# Patient Record
Sex: FEMALE | Race: WHITE | HISPANIC OR LATINO | URBAN - METROPOLITAN AREA
[De-identification: names, ages, dates, MRNs, and addresses within clinical notes are randomized per-mention and may not be internally consistent; named-entity substitution may affect disease eponyms.]

---

## 2017-01-12 ENCOUNTER — EMERGENCY (EMERGENCY)
Facility: HOSPITAL | Age: 52
LOS: 1 days | Discharge: LEFT BEFORE DISCHARGE | End: 2017-01-12
Attending: EMERGENCY MEDICINE | Admitting: EMERGENCY MEDICINE
Payer: COMMERCIAL

## 2017-01-12 VITALS
OXYGEN SATURATION: 99 % | DIASTOLIC BLOOD PRESSURE: 85 MMHG | RESPIRATION RATE: 20 BRPM | SYSTOLIC BLOOD PRESSURE: 129 MMHG | HEART RATE: 102 BPM | WEIGHT: 184.97 LBS | TEMPERATURE: 98 F | HEIGHT: 65 IN

## 2017-01-12 VITALS
HEART RATE: 88 BPM | TEMPERATURE: 99 F | DIASTOLIC BLOOD PRESSURE: 87 MMHG | SYSTOLIC BLOOD PRESSURE: 121 MMHG | OXYGEN SATURATION: 96 % | RESPIRATION RATE: 20 BRPM

## 2017-01-12 DIAGNOSIS — I72.9 ANEURYSM OF UNSPECIFIED SITE: Chronic | ICD-10-CM

## 2017-01-12 DIAGNOSIS — Z90.710 ACQUIRED ABSENCE OF BOTH CERVIX AND UTERUS: Chronic | ICD-10-CM

## 2017-01-12 LAB
ALBUMIN SERPL ELPH-MCNC: 4.1 G/DL — SIGNIFICANT CHANGE UP (ref 3.3–5.2)
ALP SERPL-CCNC: 57 U/L — SIGNIFICANT CHANGE UP (ref 40–120)
ALT FLD-CCNC: 22 U/L — SIGNIFICANT CHANGE UP
ANION GAP SERPL CALC-SCNC: 14 MMOL/L — SIGNIFICANT CHANGE UP (ref 5–17)
AST SERPL-CCNC: 33 U/L — HIGH
BASOPHILS # BLD AUTO: 0 K/UL — SIGNIFICANT CHANGE UP (ref 0–0.2)
BASOPHILS NFR BLD AUTO: 0.5 % — SIGNIFICANT CHANGE UP (ref 0–2)
BILIRUB SERPL-MCNC: 0.3 MG/DL — LOW (ref 0.4–2)
BUN SERPL-MCNC: 9 MG/DL — SIGNIFICANT CHANGE UP (ref 8–20)
CALCIUM SERPL-MCNC: 8.9 MG/DL — SIGNIFICANT CHANGE UP (ref 8.6–10.2)
CHLORIDE SERPL-SCNC: 97 MMOL/L — LOW (ref 98–107)
CK SERPL-CCNC: 144 U/L — SIGNIFICANT CHANGE UP (ref 25–170)
CO2 SERPL-SCNC: 23 MMOL/L — SIGNIFICANT CHANGE UP (ref 22–29)
CREAT SERPL-MCNC: 0.55 MG/DL — SIGNIFICANT CHANGE UP (ref 0.5–1.3)
D DIMER BLD IA.RAPID-MCNC: 1431 NG/ML DDU — HIGH
EOSINOPHIL # BLD AUTO: 0.1 K/UL — SIGNIFICANT CHANGE UP (ref 0–0.5)
EOSINOPHIL NFR BLD AUTO: 3.4 % — SIGNIFICANT CHANGE UP (ref 0–6)
GLUCOSE SERPL-MCNC: 90 MG/DL — SIGNIFICANT CHANGE UP (ref 70–115)
HCT VFR BLD CALC: 38.5 % — SIGNIFICANT CHANGE UP (ref 37–47)
HGB BLD-MCNC: 13.2 G/DL — SIGNIFICANT CHANGE UP (ref 12–16)
LYMPHOCYTES # BLD AUTO: 1.3 K/UL — SIGNIFICANT CHANGE UP (ref 1–4.8)
LYMPHOCYTES # BLD AUTO: 32.5 % — SIGNIFICANT CHANGE UP (ref 20–55)
MCHC RBC-ENTMCNC: 30 PG — SIGNIFICANT CHANGE UP (ref 27–31)
MCHC RBC-ENTMCNC: 34.3 G/DL — SIGNIFICANT CHANGE UP (ref 32–36)
MCV RBC AUTO: 87.5 FL — SIGNIFICANT CHANGE UP (ref 81–99)
MONOCYTES # BLD AUTO: 0.4 K/UL — SIGNIFICANT CHANGE UP (ref 0–0.8)
MONOCYTES NFR BLD AUTO: 10.6 % — HIGH (ref 3–10)
NEUTROPHILS # BLD AUTO: 2 K/UL — SIGNIFICANT CHANGE UP (ref 1.8–8)
NEUTROPHILS NFR BLD AUTO: 52.7 % — SIGNIFICANT CHANGE UP (ref 37–73)
NT-PROBNP SERPL-SCNC: 64 PG/ML — SIGNIFICANT CHANGE UP (ref 0–300)
PLATELET # BLD AUTO: 151 K/UL — SIGNIFICANT CHANGE UP (ref 150–400)
POTASSIUM SERPL-MCNC: 4.6 MMOL/L — SIGNIFICANT CHANGE UP (ref 3.5–5.3)
POTASSIUM SERPL-SCNC: 4.6 MMOL/L — SIGNIFICANT CHANGE UP (ref 3.5–5.3)
PROT SERPL-MCNC: 8.5 G/DL — SIGNIFICANT CHANGE UP (ref 6.6–8.7)
RBC # BLD: 4.4 M/UL — SIGNIFICANT CHANGE UP (ref 4.4–5.2)
RBC # FLD: 13.4 % — SIGNIFICANT CHANGE UP (ref 11–15.6)
SODIUM SERPL-SCNC: 134 MMOL/L — LOW (ref 135–145)
TROPONIN T SERPL-MCNC: <0.01 NG/ML — SIGNIFICANT CHANGE UP (ref 0–0.06)
WBC # BLD: 3.88 K/UL — LOW (ref 4.8–10.8)
WBC # FLD AUTO: 3.88 K/UL — LOW (ref 4.8–10.8)

## 2017-01-12 PROCEDURE — 99285 EMERGENCY DEPT VISIT HI MDM: CPT

## 2017-01-12 PROCEDURE — 80053 COMPREHEN METABOLIC PANEL: CPT

## 2017-01-12 PROCEDURE — 71045 X-RAY EXAM CHEST 1 VIEW: CPT

## 2017-01-12 PROCEDURE — 83880 ASSAY OF NATRIURETIC PEPTIDE: CPT

## 2017-01-12 PROCEDURE — 84484 ASSAY OF TROPONIN QUANT: CPT

## 2017-01-12 PROCEDURE — 85379 FIBRIN DEGRADATION QUANT: CPT

## 2017-01-12 PROCEDURE — 82550 ASSAY OF CK (CPK): CPT

## 2017-01-12 PROCEDURE — 71010: CPT | Mod: 26

## 2017-01-12 PROCEDURE — 93010 ELECTROCARDIOGRAM REPORT: CPT

## 2017-01-12 PROCEDURE — 85027 COMPLETE CBC AUTOMATED: CPT

## 2017-01-12 PROCEDURE — 93005 ELECTROCARDIOGRAM TRACING: CPT

## 2017-01-12 PROCEDURE — 99284 EMERGENCY DEPT VISIT MOD MDM: CPT | Mod: 25

## 2017-01-12 NOTE — ED ADULT NURSE NOTE - EXPLANATION OF PATIENT'S REASON FOR LEAVING
Stated "Too Fuckin Busy in here", Pt was advised of benefits of staying here for treatment and risks of leaving, pt left after IV removed

## 2017-01-12 NOTE — ED ADULT TRIAGE NOTE - CHIEF COMPLAINT QUOTE
Pt comes from front lobby as rapid response. Pt c/o sudden onset chest pressure and sob approx 20 minutes ago.

## 2017-01-12 NOTE — ED PROVIDER NOTE - MEDICAL DECISION MAKING DETAILS
51yoF with intermittent chest pain/ SOB/ nausea, now with syncope - no recent cardiac eval. would recommend admission  for cardiac eval.

## 2017-01-12 NOTE — ED ADULT NURSE REASSESSMENT NOTE - NS ED NURSE REASSESS COMMENT FT1
Pt remains in ambulance triage, just now reports that she originally came to the hospital for a fall and syncopal episode. Pt c/o pain to right chest - deformity to right clavicle.

## 2017-01-12 NOTE — ED ADULT NURSE NOTE - OBJECTIVE STATEMENT
Pt A&OX3, c/o chest pain since this AM.  Pt is crying and states she fell on cement when she walked out of her home to come to ED, pt states she passed out.   Pt c/o severe pain to right clavicle, bulge noted to right clavicle.  Pt state she is having "relationship issues" and has been very stressed out.  Pt denies any sob.  Clear bsb, abd soft nondistended, nontender, moving all ext well.  Pt also has an abrasion to right leg.  NSR on monitor.  CM maintained.

## 2017-01-12 NOTE — ED PROVIDER NOTE - OBJECTIVE STATEMENT
51F with h/o anxiety, intermittent chest pain and syncope in the past ( no known cardiac etiology) pw several days of intermittent chest pain associated with nausea, SOB, and syncopal episode today in which she fell on the concrete outside her house. Denies prodrome.  Mostly c/o R clavicle pain now ( h/o fracture there).  Was seen in the past at [facility] and lives in [place] but wanted to come here. Denies recent travel/ calf swelling. No fevers/chills. Does not have a cardiologist. Denies any stress testing within the last year.  PMD: / Mery Lynch.

## 2017-01-13 ENCOUNTER — EMERGENCY (EMERGENCY)
Facility: HOSPITAL | Age: 52
LOS: 1 days | Discharge: LEFT BEFORE DISCHARGE | End: 2017-01-13
Payer: COMMERCIAL

## 2017-01-13 VITALS — HEIGHT: 65 IN | WEIGHT: 184.97 LBS

## 2017-01-13 VITALS
SYSTOLIC BLOOD PRESSURE: 144 MMHG | RESPIRATION RATE: 18 BRPM | OXYGEN SATURATION: 99 % | HEART RATE: 73 BPM | DIASTOLIC BLOOD PRESSURE: 94 MMHG | TEMPERATURE: 98 F

## 2017-01-13 DIAGNOSIS — Z04.9 ENCOUNTER FOR EXAMINATION AND OBSERVATION FOR UNSPECIFIED REASON: ICD-10-CM

## 2017-01-13 DIAGNOSIS — I72.9 ANEURYSM OF UNSPECIFIED SITE: Chronic | ICD-10-CM

## 2017-01-13 DIAGNOSIS — Z90.710 ACQUIRED ABSENCE OF BOTH CERVIX AND UTERUS: Chronic | ICD-10-CM

## 2017-01-13 PROCEDURE — T1013: CPT

## 2017-01-13 NOTE — ED POST DISCHARGE NOTE - REASON FOR FOLLOW-UP
Other + d dimer w/ chest pain- pt walked out - left messages in english and Indonesian and sent letter + d dimer w/ chest pain & syncope- pt walked out - left messages in english and Cuban and sent letter

## 2017-04-14 RX ORDER — CHLORHEXIDINE GLUCONATE 213 G/1000ML
1 SOLUTION TOPICAL ONCE
Qty: 0 | Refills: 0 | Status: DISCONTINUED | OUTPATIENT
Start: 2017-04-17 | End: 2017-04-17

## 2017-04-17 ENCOUNTER — OUTPATIENT (OUTPATIENT)
Dept: OUTPATIENT SERVICES | Facility: HOSPITAL | Age: 52
LOS: 1 days | Discharge: NURSING, REHAB OR LONG-TERM CARE | End: 2017-04-17
Payer: COMMERCIAL

## 2017-04-17 DIAGNOSIS — M19.90 UNSPECIFIED OSTEOARTHRITIS, UNSPECIFIED SITE: ICD-10-CM

## 2017-04-17 DIAGNOSIS — E66.9 OBESITY, UNSPECIFIED: ICD-10-CM

## 2017-04-17 DIAGNOSIS — Z90.710 ACQUIRED ABSENCE OF BOTH CERVIX AND UTERUS: Chronic | ICD-10-CM

## 2017-04-17 DIAGNOSIS — F41.9 ANXIETY DISORDER, UNSPECIFIED: ICD-10-CM

## 2017-04-17 DIAGNOSIS — I60.2 NONTRAUMATIC SUBARACHNOID HEMORRHAGE FROM ANTERIOR COMMUNICATING ARTERY: ICD-10-CM

## 2017-04-17 DIAGNOSIS — Z09 ENCOUNTER FOR FOLLOW-UP EXAMINATION AFTER COMPLETED TREATMENT FOR CONDITIONS OTHER THAN MALIGNANT NEOPLASM: ICD-10-CM

## 2017-04-17 DIAGNOSIS — Z86.79 PERSONAL HISTORY OF OTHER DISEASES OF THE CIRCULATORY SYSTEM: ICD-10-CM

## 2017-04-17 DIAGNOSIS — I72.9 ANEURYSM OF UNSPECIFIED SITE: Chronic | ICD-10-CM

## 2017-04-17 PROCEDURE — 93005 ELECTROCARDIOGRAM TRACING: CPT

## 2017-04-17 PROCEDURE — 36224 PLACE CATH CAROTD ART: CPT | Mod: RT

## 2017-04-17 PROCEDURE — C1894: CPT

## 2017-04-17 PROCEDURE — 93010 ELECTROCARDIOGRAM REPORT: CPT

## 2017-04-17 PROCEDURE — C1769: CPT

## 2017-04-17 PROCEDURE — C1887: CPT

## 2017-04-17 NOTE — BRIEF OPERATIVE NOTE - OPERATION/FINDINGS
PROCEDURE  Diagnostic femoral cerebral angiogram    2% lidocaine local anesthesia  4Fr short arterial- sheath, R CFA  R ICA injection  Stable complete occlusion of AComA aneurysm S/P coil embolization  Manual groin compression  No complications, no groin hematoma

## 2017-05-01 ENCOUNTER — EMERGENCY (EMERGENCY)
Facility: HOSPITAL | Age: 52
LOS: 1 days | Discharge: HOME | End: 2017-05-01
Attending: EMERGENCY MEDICINE | Admitting: EMERGENCY MEDICINE
Payer: COMMERCIAL

## 2017-05-01 ENCOUNTER — OUTPATIENT (OUTPATIENT)
Dept: OUTPATIENT SERVICES | Facility: HOSPITAL | Age: 52
LOS: 1 days | End: 2017-05-01
Payer: COMMERCIAL

## 2017-05-01 VITALS
DIASTOLIC BLOOD PRESSURE: 82 MMHG | TEMPERATURE: 98 F | OXYGEN SATURATION: 99 % | SYSTOLIC BLOOD PRESSURE: 114 MMHG | RESPIRATION RATE: 18 BRPM | HEART RATE: 69 BPM

## 2017-05-01 VITALS
DIASTOLIC BLOOD PRESSURE: 83 MMHG | RESPIRATION RATE: 18 BRPM | TEMPERATURE: 98 F | SYSTOLIC BLOOD PRESSURE: 124 MMHG | OXYGEN SATURATION: 99 % | HEART RATE: 68 BPM

## 2017-05-01 DIAGNOSIS — G43.909 MIGRAINE, UNSPECIFIED, NOT INTRACTABLE, WITHOUT STATUS MIGRAINOSUS: ICD-10-CM

## 2017-05-01 DIAGNOSIS — I72.9 ANEURYSM OF UNSPECIFIED SITE: Chronic | ICD-10-CM

## 2017-05-01 DIAGNOSIS — R42 DIZZINESS AND GIDDINESS: ICD-10-CM

## 2017-05-01 DIAGNOSIS — Z90.710 ACQUIRED ABSENCE OF BOTH CERVIX AND UTERUS: Chronic | ICD-10-CM

## 2017-05-01 DIAGNOSIS — Z88.8 ALLERGY STATUS TO OTHER DRUGS, MEDICAMENTS AND BIOLOGICAL SUBSTANCES STATUS: ICD-10-CM

## 2017-05-01 DIAGNOSIS — Z79.899 OTHER LONG TERM (CURRENT) DRUG THERAPY: ICD-10-CM

## 2017-05-01 PROCEDURE — 85027 COMPLETE CBC AUTOMATED: CPT

## 2017-05-01 PROCEDURE — 70544 MR ANGIOGRAPHY HEAD W/O DYE: CPT | Mod: 26

## 2017-05-01 PROCEDURE — 36415 COLL VENOUS BLD VENIPUNCTURE: CPT

## 2017-05-01 PROCEDURE — 93005 ELECTROCARDIOGRAM TRACING: CPT

## 2017-05-01 PROCEDURE — 99284 EMERGENCY DEPT VISIT MOD MDM: CPT | Mod: 25

## 2017-05-01 PROCEDURE — 80048 BASIC METABOLIC PNL TOTAL CA: CPT

## 2017-05-01 PROCEDURE — 99285 EMERGENCY DEPT VISIT HI MDM: CPT

## 2017-05-01 PROCEDURE — 70544 MR ANGIOGRAPHY HEAD W/O DYE: CPT

## 2017-05-01 PROCEDURE — 84484 ASSAY OF TROPONIN QUANT: CPT

## 2017-05-01 PROCEDURE — 96374 THER/PROPH/DIAG INJ IV PUSH: CPT

## 2017-05-01 PROCEDURE — 96375 TX/PRO/DX INJ NEW DRUG ADDON: CPT

## 2017-05-01 RX ORDER — ZOLPIDEM TARTRATE 10 MG/1
1 TABLET ORAL
Qty: 0 | Refills: 0 | COMMUNITY

## 2017-05-01 RX ORDER — MAGNESIUM SULFATE 500 MG/ML
2 VIAL (ML) INJECTION ONCE
Qty: 0 | Refills: 0 | Status: COMPLETED | OUTPATIENT
Start: 2017-05-01 | End: 2017-05-01

## 2017-05-01 RX ORDER — ALPRAZOLAM 0.25 MG
0 TABLET ORAL
Qty: 0 | Refills: 0 | COMMUNITY

## 2017-05-01 RX ORDER — DIPHENHYDRAMINE HCL 50 MG
25 CAPSULE ORAL ONCE
Qty: 0 | Refills: 0 | Status: COMPLETED | OUTPATIENT
Start: 2017-05-01 | End: 2017-05-01

## 2017-05-01 RX ORDER — MORPHINE SULFATE 50 MG/1
2 CAPSULE, EXTENDED RELEASE ORAL ONCE
Qty: 0 | Refills: 0 | Status: DISCONTINUED | OUTPATIENT
Start: 2017-05-01 | End: 2017-05-01

## 2017-05-01 RX ORDER — METOCLOPRAMIDE HCL 10 MG
10 TABLET ORAL ONCE
Qty: 0 | Refills: 0 | Status: COMPLETED | OUTPATIENT
Start: 2017-05-01 | End: 2017-05-01

## 2017-05-01 RX ORDER — SODIUM CHLORIDE 9 MG/ML
1000 INJECTION INTRAMUSCULAR; INTRAVENOUS; SUBCUTANEOUS
Qty: 0 | Refills: 0 | Status: DISCONTINUED | OUTPATIENT
Start: 2017-05-01 | End: 2017-05-05

## 2017-05-01 RX ADMIN — MORPHINE SULFATE 2 MILLIGRAM(S): 50 CAPSULE, EXTENDED RELEASE ORAL at 12:51

## 2017-05-01 RX ADMIN — MORPHINE SULFATE 2 MILLIGRAM(S): 50 CAPSULE, EXTENDED RELEASE ORAL at 13:25

## 2017-05-01 RX ADMIN — Medication 10 MILLIGRAM(S): at 12:55

## 2017-05-01 RX ADMIN — SODIUM CHLORIDE 125 MILLILITER(S): 9 INJECTION INTRAMUSCULAR; INTRAVENOUS; SUBCUTANEOUS at 12:56

## 2017-05-01 RX ADMIN — Medication 25 MILLIGRAM(S): at 12:58

## 2017-05-01 RX ADMIN — Medication 50 GRAM(S): at 12:57
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
## 2017-05-01 NOTE — ED PROVIDER NOTE - PHYSICAL EXAMINATION
CONSTITUTIONAL: Well-appearing; well-nourished; in mild distress due to pain and photophobia  HEAD: Normocephalic; atraumatic.   EYES: PERRL; EOM intact; conjunctiva and sclera clear  ENT: normal nose; no rhinorrhea; normal pharynx with no erythema or lesions.   NECK: Supple; non-tender;   CARDIOVASCULAR: Normal S1, S2; no murmurs, rubs, or gallops. Regular rate and rhythm.   RESPIRATORY: Breathing easily; breath sounds clear and equal bilaterally; no wheezes, rhonchi, or rales.  GI: Soft; non-distended; non-tender; no palpable organomegaly.   EXT: No cyanosis or edema; N/V intact, +painful range of motion in her right shoulder, DNVI  SKIN: Normal for age and race; warm; dry; good turgor; no apparent lesions or rash.   NEURO: A & O x 3; face symmetric; grossly unremarkable.moves all four, sensory intact , strength 5/5, intact cerebellar findings  PSYCHOLOGICAL: The patient’s mood and manner are appropriate.

## 2017-05-01 NOTE — ED PROVIDER NOTE - MEDICAL DECISION MAKING DETAILS
patient states her sx have resolved,and is insistent on going home as her ride is outside.  right arm pain improved, she is able to range it without difficulty and states her right arm typically hurts as she broke her clavicle in the past.  states if her sx worsen she will go to see her doctor today hen she gets to [place]
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
## 2017-08-09 ENCOUNTER — INPATIENT (INPATIENT)
Facility: HOSPITAL | Age: 52
LOS: 1 days | Discharge: HOME | End: 2017-08-11
Payer: MEDICAID

## 2017-08-09 DIAGNOSIS — I72.9 ANEURYSM OF UNSPECIFIED SITE: Chronic | ICD-10-CM

## 2017-08-09 DIAGNOSIS — Z90.710 ACQUIRED ABSENCE OF BOTH CERVIX AND UTERUS: Chronic | ICD-10-CM

## 2017-08-09 PROCEDURE — 71010: CPT | Mod: 26

## 2017-08-09 PROCEDURE — 73562 X-RAY EXAM OF KNEE 3: CPT | Mod: 26,LT

## 2017-08-09 PROCEDURE — 70450 CT HEAD/BRAIN W/O DYE: CPT | Mod: 26

## 2017-08-09 PROCEDURE — 72170 X-RAY EXAM OF PELVIS: CPT | Mod: 26

## 2017-08-09 PROCEDURE — 72125 CT NECK SPINE W/O DYE: CPT | Mod: 26

## 2017-08-09 PROCEDURE — 73030 X-RAY EXAM OF SHOULDER: CPT | Mod: 26,LT

## 2017-08-09 PROCEDURE — 99285 EMERGENCY DEPT VISIT HI MDM: CPT

## 2017-08-10 PROCEDURE — 93880 EXTRACRANIAL BILAT STUDY: CPT | Mod: 26

## 2017-08-10 PROCEDURE — 99223 1ST HOSP IP/OBS HIGH 75: CPT

## 2017-08-11 PROCEDURE — 93306 TTE W/DOPPLER COMPLETE: CPT | Mod: 26

## 2017-08-11 PROCEDURE — 99232 SBSQ HOSP IP/OBS MODERATE 35: CPT

## 2017-11-07 RX ORDER — ALPRAZOLAM 0.25 MG
0 TABLET ORAL
Qty: 0 | Refills: 0 | COMMUNITY

## 2017-11-07 RX ORDER — ZOLPIDEM TARTRATE 10 MG/1
0 TABLET ORAL
Qty: 0 | Refills: 0 | COMMUNITY

## 2019-05-07 PROBLEM — F41.9 ANXIETY DISORDER, UNSPECIFIED: Chronic | Status: ACTIVE | Noted: 2017-01-12

## 2019-05-07 PROBLEM — G47.00 INSOMNIA, UNSPECIFIED: Chronic | Status: ACTIVE | Noted: 2017-01-12

## 2019-05-07 PROBLEM — I67.1 CEREBRAL ANEURYSM, NONRUPTURED: Chronic | Status: ACTIVE | Noted: 2017-05-01

## 2019-05-07 PROBLEM — I72.9 ANEURYSM OF UNSPECIFIED SITE: Chronic | Status: ACTIVE | Noted: 2017-01-12

## 2019-07-09 ENCOUNTER — APPOINTMENT (OUTPATIENT)
Dept: MRI IMAGING | Facility: HOSPITAL | Age: 54
End: 2019-07-09

## 2019-09-17 ENCOUNTER — APPOINTMENT (OUTPATIENT)
Dept: NEUROSURGERY | Facility: CLINIC | Age: 54
End: 2019-09-17
Payer: MEDICAID

## 2019-09-17 VITALS
SYSTOLIC BLOOD PRESSURE: 134 MMHG | RESPIRATION RATE: 16 BRPM | HEIGHT: 62 IN | DIASTOLIC BLOOD PRESSURE: 85 MMHG | WEIGHT: 200 LBS | BODY MASS INDEX: 36.8 KG/M2 | HEART RATE: 75 BPM | OXYGEN SATURATION: 97 %

## 2019-09-17 DIAGNOSIS — Z56.0 UNEMPLOYMENT, UNSPECIFIED: ICD-10-CM

## 2019-09-17 PROCEDURE — 99214 OFFICE O/P EST MOD 30 MIN: CPT

## 2019-09-17 RX ORDER — DIAZEPAM 5 MG/1
5 TABLET ORAL
Qty: 1 | Refills: 0 | Status: COMPLETED | COMMUNITY
Start: 2017-04-25 | End: 2019-09-17

## 2019-09-17 SDOH — ECONOMIC STABILITY - INCOME SECURITY: UNEMPLOYMENT, UNSPECIFIED: Z56.0

## 2019-09-17 NOTE — REASON FOR VISIT
[Follow-Up: _____] : a [unfilled] follow-up visit [FreeTextEntry1] : Received a call from patient on 8/9/19 stating that she is currently admitted to [facility] s/p cerebral angiogram for complaints as she experienced new onset severe LEFT temporal HA,  and LEFT "hand shaking". Stroke workup done. EEG negative for seizures, per patient.\par \par She is doing well. She currently denies HAs, focal weakness.\par \par

## 2019-09-17 NOTE — ADDENDUM
[FreeTextEntry1] : Dear Dr. Olson:\par \par We saw Kelley in our office at [facility].  As you know, she is a 54 years-old left-handed woman with past medical history significant for subarachnoid hemorrhage in January 2005.  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  She had a remarkable recovery.  Neurological examination is normal.  Follow up neuro-imaging demonstrated new aneurysm formation at the level of the neck of the previously coiled aneurysm.  She underwent coiling of the new aneurysm formation on December 2013.  A follow up from 2015 and 2017 didn’t reveal recanalization.  She was doing well until last month when she was admitted to [facility] complaining of severe headaches.  We reviewed an MRI of the brain and a cerebral angiogram from last month.  There is no evidence of ischemic stroke, hemorrhage, coil compaction or recanalization of the treated aneurysm.\par \par A follow up MRA of the brain is recommended in 5 years. \par \par Thank you for allowing us to participate in Kelley’ care.  I will keep you updated at all times.\par \par Sincerely,\par \par \par Derek Zepeda MD\par Chief\par Neuro-Endovascular Surgery and \par Interventional Neuroradiology \par [facility]\par [address]\par [address]\[place] [address]\par T:  [phone]\par F:  [phone]\par

## 2019-09-17 NOTE — HISTORY OF PRESENT ILLNESS
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 Follow up neuro-imaging demonstrated a new aneurysm formation at the level of the neck of the previously coiled aneurysm. She underwent  coiling of the new aneurysm formation on December 2013. Follow up from Jan 2015 and a cerebral angiogram from April 17, 2017 did not show residual, coil compaction or recanalization of previously coiled anterior communicating complex. She was advised to obtain a baseline MRA of the brain and follow up with another MRA brain in 2 years (April 2019).\par \par Handedness: LEFT\par \par Patient Address:\par [address]\[place] [address]\par \par \par PCP:\par Dr. Marla Olson\par [address]\[place] [address]\par [phone]

## 2019-09-17 NOTE — PHYSICAL EXAM
[General Appearance - Alert] : alert [Person] : oriented to person [Oriented To Time, Place, And Person] : oriented to person, place, and time [Place] : oriented to place [Time] : oriented to time [Cranial Nerves Optic (II)] : visual acuity intact bilaterally,  pupils equal round and reactive to light [Cranial Nerves Oculomotor (III)] : extraocular motion intact [Cranial Nerves Trigeminal (V)] : facial sensation intact symmetrically [Cranial Nerves Facial (VII)] : face symmetrical [Cranial Nerves Vestibulocochlear (VIII)] : hearing was intact bilaterally [Cranial Nerves Glossopharyngeal (IX)] : tongue and palate midline [Cranial Nerves Accessory (XI - Cranial And Spinal)] : head turning and shoulder shrug symmetric [Cranial Nerves Hypoglossal (XII)] : there was no tongue deviation with protrusion [Motor Tone] : muscle tone was normal in all four extremities [Motor Strength] : muscle strength was normal in all four extremities [Respiration, Rhythm And Depth] : normal respiratory rhythm and effort [] : no respiratory distress [Apical Impulse] : the apical impulse was normal [Heart Rate And Rhythm] : heart rate was normal and rhythm regular [Abnormal Walk] : normal gait

## 2019-09-17 NOTE — DATA REVIEWED
[de-identified] : MRI brain from 8/6/19: no acute infract [de-identified] : MRA brain/neck from 8/6/19:  There is no evidence of ischemic stroke, hemorrhage, coil compaction or recanalization of the treated aneurysm. [de-identified] : Cerebral angiogram from 8/2019:  no coil compaction or recanalization of the treated aneurysm.

## 2019-09-17 NOTE — END OF VISIT
[FreeTextEntry3] : Written by Bella Lane NP acting as a scribe for Dr. Derek Zepeda MD.  The documentation recorded by the scribe accurately reflects the service I personally performed and the decisions made by me, Dr. Derek Zepeda \par \par \par

## 2019-09-17 NOTE — ASSESSMENT
[FreeTextEntry1] : We reviewed an MRI of the brain and a cerebral angiogram from last month.  There is no evidence of ischemic stroke, hemorrhage, coil compaction or recanalization of the treated aneurysm.\par \par Plan:\par 1. Follow up MRA brain in 5 years and RTO to review it.

## 2019-11-19 ENCOUNTER — OUTPATIENT (OUTPATIENT)
Dept: OUTPATIENT SERVICES | Facility: HOSPITAL | Age: 54
LOS: 1 days | End: 2019-11-19
Payer: MEDICAID

## 2019-11-19 DIAGNOSIS — I72.9 ANEURYSM OF UNSPECIFIED SITE: Chronic | ICD-10-CM

## 2019-11-19 DIAGNOSIS — Z90.710 ACQUIRED ABSENCE OF BOTH CERVIX AND UTERUS: Chronic | ICD-10-CM

## 2019-11-19 PROCEDURE — 93010 ELECTROCARDIOGRAM REPORT: CPT

## 2019-11-20 ENCOUNTER — APPOINTMENT (OUTPATIENT)
Dept: GASTROENTEROLOGY | Facility: CLINIC | Age: 54
End: 2019-11-20

## 2019-11-26 ENCOUNTER — OUTPATIENT (OUTPATIENT)
Dept: OUTPATIENT SERVICES | Facility: HOSPITAL | Age: 54
LOS: 1 days | End: 2019-11-26
Payer: MEDICAID

## 2019-11-26 DIAGNOSIS — I72.9 ANEURYSM OF UNSPECIFIED SITE: Chronic | ICD-10-CM

## 2019-11-26 DIAGNOSIS — Z90.710 ACQUIRED ABSENCE OF BOTH CERVIX AND UTERUS: Chronic | ICD-10-CM

## 2019-11-26 PROCEDURE — 74220 X-RAY XM ESOPHAGUS 1CNTRST: CPT | Mod: 26

## 2020-02-11 ENCOUNTER — APPOINTMENT (OUTPATIENT)
Dept: FAMILY MEDICINE | Facility: CLINIC | Age: 55
End: 2020-02-11
Payer: MEDICAID

## 2020-02-11 VITALS
TEMPERATURE: 98.2 F | OXYGEN SATURATION: 97 % | DIASTOLIC BLOOD PRESSURE: 88 MMHG | BODY MASS INDEX: 34.16 KG/M2 | WEIGHT: 205 LBS | RESPIRATION RATE: 18 BRPM | HEIGHT: 65 IN | HEART RATE: 90 BPM | SYSTOLIC BLOOD PRESSURE: 121 MMHG

## 2020-02-11 DIAGNOSIS — Z76.89 PERSONS ENCOUNTERING HEALTH SERVICES IN OTHER SPECIFIED CIRCUMSTANCES: ICD-10-CM

## 2020-02-11 DIAGNOSIS — Z80.1 FAMILY HISTORY OF MALIGNANT NEOPLASM OF TRACHEA, BRONCHUS AND LUNG: ICD-10-CM

## 2020-02-11 DIAGNOSIS — R53.83 OTHER FATIGUE: ICD-10-CM

## 2020-02-11 DIAGNOSIS — Z82.5 FAMILY HISTORY OF ASTHMA AND OTHER CHRONIC LOWER RESPIRATORY DISEASES: ICD-10-CM

## 2020-02-11 DIAGNOSIS — Z86.79 PERSONAL HISTORY OF OTHER DISEASES OF THE CIRCULATORY SYSTEM: ICD-10-CM

## 2020-02-11 DIAGNOSIS — J45.901 UNSPECIFIED ASTHMA WITH (ACUTE) EXACERBATION: ICD-10-CM

## 2020-02-11 PROCEDURE — 94010 BREATHING CAPACITY TEST: CPT

## 2020-02-11 PROCEDURE — 99213 OFFICE O/P EST LOW 20 MIN: CPT | Mod: 25

## 2020-02-11 RX ORDER — NUT.TX.IMPAIRED DIGESTIVE FXN 0.035-1/ML
LIQUID (ML) ORAL
Qty: 1 | Refills: 3 | Status: ACTIVE | COMMUNITY
Start: 2020-02-11 | End: 1900-01-01

## 2020-02-11 RX ORDER — IPRATROPIUM BROMIDE AND ALBUTEROL SULFATE 2.5; .5 MG/3ML; MG/3ML
0.5-2.5 (3) SOLUTION RESPIRATORY (INHALATION)
Qty: 1 | Refills: 0 | Status: ACTIVE | COMMUNITY
Start: 2020-02-11 | End: 1900-01-01

## 2020-02-11 RX ORDER — SOFT LENS DISINFECTANT
SOLUTION, NON-ORAL MISCELLANEOUS
Qty: 1 | Refills: 0 | Status: ACTIVE | COMMUNITY
Start: 2020-02-11 | End: 1900-01-01

## 2020-02-11 NOTE — REVIEW OF SYSTEMS
[Negative] : Heme/Lymph [Fatigue] : fatigue [Shortness Of Breath] : shortness of breath [Wheezing] : wheezing [Fever] : no fever [Chills] : no chills [Hot Flashes] : no hot flashes [Night Sweats] : no night sweats [Recent Change In Weight] : ~T no recent weight change [Cough] : no cough [Dyspnea on Exertion] : no dyspnea on exertion

## 2020-02-11 NOTE — ASSESSMENT
[FreeTextEntry1] : Importance of treatment and medication adherence discussed .S/e of medication explained and patient understands.The diagnosis and care plan were discussed in detail with patient .Understanding assessed via teach back method .All questions answered .\par \par RTO in 1 week for follow up\par

## 2020-02-11 NOTE — HISTORY OF PRESENT ILLNESS
[FreeTextEntry8] : LUIS COFFEY 54 year yrs old  female with PMH of Cerebral Aneurysm s/p Coil embolization 2005 and 2013 ,h/o of DVT (Provoked DVT) ,Asthma (Proair as needed) presents to with CC Fatigue x 2 weeks\par Patient states fatigue started all of sudden ,feeling tired and sleeping , didn’t want to cook and bake .Patient states her SOB getting worse ,no trouble talking and takes pump feels better using pump once a day and on and off wheezing  . No fever ,chills ,chest pain  ,leg swelling ,URI symptoms ,sick contacts . \par Works as  from 9 -5 PM and is on disability \par Changing PCP she would get records from the previous PCP . \par Dr Gilberto Arango(Pulmonologist ) -has next appointment with her in a March 2 at 2 PM for 15 years  .\par

## 2020-02-11 NOTE — HEALTH RISK ASSESSMENT
[No] : No [No falls in past year] : Patient reported no falls in the past year [0] : 2) Feeling down, depressed, or hopeless: Not at all (0) [] : No [de-identified] : regular

## 2020-02-11 NOTE — PHYSICAL EXAM
[No Acute Distress] : no acute distress [Well Nourished] : well nourished [Well Developed] : well developed [Well-Appearing] : well-appearing [Normal Sclera/Conjunctiva] : normal sclera/conjunctiva [PERRL] : pupils equal round and reactive to light [EOMI] : extraocular movements intact [Normal Outer Ear/Nose] : the outer ears and nose were normal in appearance [Normal Oropharynx] : the oropharynx was normal [No JVD] : no jugular venous distention [No Lymphadenopathy] : no lymphadenopathy [Thyroid Normal, No Nodules] : the thyroid was normal and there were no nodules present [Supple] : supple [No Respiratory Distress] : no respiratory distress  [No Accessory Muscle Use] : no accessory muscle use [Normal Rate] : normal rate  [Regular Rhythm] : with a regular rhythm [Normal S1, S2] : normal S1 and S2 [No Murmur] : no murmur heard [No Carotid Bruits] : no carotid bruits [No Abdominal Bruit] : a ~M bruit was not heard ~T in the abdomen [No Varicosities] : no varicosities [Pedal Pulses Present] : the pedal pulses are present [No Edema] : there was no peripheral edema [No Palpable Aorta] : no palpable aorta [No Extremity Clubbing/Cyanosis] : no extremity clubbing/cyanosis [Soft] : abdomen soft [Non Tender] : non-tender [Non-distended] : non-distended [No Masses] : no abdominal mass palpated [No HSM] : no HSM [Normal Bowel Sounds] : normal bowel sounds [Normal Posterior Cervical Nodes] : no posterior cervical lymphadenopathy [Normal Anterior Cervical Nodes] : no anterior cervical lymphadenopathy [No CVA Tenderness] : no CVA  tenderness [No Joint Swelling] : no joint swelling [No Spinal Tenderness] : no spinal tenderness [Grossly Normal Strength/Tone] : grossly normal strength/tone [No Rash] : no rash [Coordination Grossly Intact] : coordination grossly intact [No Focal Deficits] : no focal deficits [Normal Gait] : normal gait [Deep Tendon Reflexes (DTR)] : deep tendon reflexes were 2+ and symmetric [Normal Affect] : the affect was normal [Normal Insight/Judgement] : insight and judgment were intact [de-identified] : scattered wheezing in the left side of the lung

## 2020-02-25 ENCOUNTER — APPOINTMENT (OUTPATIENT)
Dept: FAMILY MEDICINE | Facility: CLINIC | Age: 55
End: 2020-02-25

## 2020-10-22 ENCOUNTER — NON-APPOINTMENT (OUTPATIENT)
Age: 55
End: 2020-10-22

## 2020-10-22 DIAGNOSIS — R51.9 HEADACHE, UNSPECIFIED: ICD-10-CM

## 2021-05-04 ENCOUNTER — OUTPATIENT (OUTPATIENT)
Dept: OUTPATIENT SERVICES | Facility: HOSPITAL | Age: 56
LOS: 1 days | End: 2021-05-04

## 2021-05-04 DIAGNOSIS — Z90.710 ACQUIRED ABSENCE OF BOTH CERVIX AND UTERUS: Chronic | ICD-10-CM

## 2021-05-04 DIAGNOSIS — I72.9 ANEURYSM OF UNSPECIFIED SITE: Chronic | ICD-10-CM

## 2021-05-10 ENCOUNTER — APPOINTMENT (OUTPATIENT)
Dept: FAMILY MEDICINE | Facility: CLINIC | Age: 56
End: 2021-05-10
Payer: MEDICAID

## 2021-05-10 DIAGNOSIS — Z86.19 PERSONAL HISTORY OF OTHER INFECTIOUS AND PARASITIC DISEASES: ICD-10-CM

## 2021-05-10 DIAGNOSIS — R16.0 HEPATOMEGALY, NOT ELSEWHERE CLASSIFIED: ICD-10-CM

## 2021-05-10 DIAGNOSIS — G47.00 INSOMNIA, UNSPECIFIED: ICD-10-CM

## 2021-05-10 DIAGNOSIS — Z01.818 ENCOUNTER FOR OTHER PREPROCEDURAL EXAMINATION: ICD-10-CM

## 2021-05-10 DIAGNOSIS — R73.03 PREDIABETES.: ICD-10-CM

## 2021-05-10 DIAGNOSIS — R16.1 SPLENOMEGALY, NOT ELSEWHERE CLASSIFIED: ICD-10-CM

## 2021-05-10 PROCEDURE — 99214 OFFICE O/P EST MOD 30 MIN: CPT

## 2021-05-10 PROCEDURE — 99072 ADDL SUPL MATRL&STAF TM PHE: CPT

## 2021-05-10 RX ORDER — MOMETASONE FUROATE 1 MG/G
0.1 OINTMENT TOPICAL
Qty: 15 | Refills: 0 | Status: COMPLETED | COMMUNITY
Start: 2020-08-17

## 2021-05-10 RX ORDER — CLOBETASOL PROPIONATE 0.5 MG/ML
0.05 SOLUTION TOPICAL
Qty: 25 | Refills: 0 | Status: COMPLETED | COMMUNITY
Start: 2021-02-04

## 2021-05-10 RX ORDER — ALBUTEROL SULFATE 90 UG/1
108 (90 BASE) INHALANT RESPIRATORY (INHALATION)
Qty: 1 | Refills: 3 | Status: ACTIVE | COMMUNITY
Start: 2021-05-10

## 2021-05-10 RX ORDER — AMOXICILLIN 500 MG/1
500 CAPSULE ORAL
Qty: 58 | Refills: 0 | Status: COMPLETED | COMMUNITY
Start: 2021-03-05

## 2021-05-10 RX ORDER — SULFAMETHOXAZOLE AND TRIMETHOPRIM 800; 160 MG/1; MG/1
800-160 TABLET ORAL
Qty: 14 | Refills: 0 | Status: COMPLETED | COMMUNITY
Start: 2021-04-07

## 2021-05-10 RX ORDER — FLUCONAZOLE 150 MG/1
150 TABLET ORAL
Qty: 12 | Refills: 0 | Status: COMPLETED | COMMUNITY
Start: 2020-11-17

## 2021-05-10 RX ORDER — FLUOCINOLONE ACETONIDE 0.1 MG/ML
0.01 SOLUTION TOPICAL
Qty: 60 | Refills: 0 | Status: COMPLETED | COMMUNITY
Start: 2021-02-04

## 2021-05-10 RX ORDER — METRONIDAZOLE 500 MG/1
500 TABLET ORAL
Qty: 14 | Refills: 0 | Status: COMPLETED | COMMUNITY
Start: 2021-04-05

## 2021-05-10 RX ORDER — COVID-19 TEST SPECIMEN COLLECT
MISCELLANEOUS MISCELLANEOUS
Qty: 1 | Refills: 0 | Status: COMPLETED | COMMUNITY
Start: 2021-01-02

## 2021-05-10 RX ORDER — ZOLPIDEM TARTRATE 10 MG/1
10 TABLET ORAL
Qty: 3 | Refills: 0 | Status: ACTIVE | COMMUNITY
Start: 2021-05-10

## 2021-05-10 RX ORDER — MOMETASONE FUROATE 1 MG/ML
0.1 SOLUTION TOPICAL
Qty: 30 | Refills: 0 | Status: COMPLETED | COMMUNITY
Start: 2020-08-17

## 2021-05-10 RX ORDER — PREDNISONE 20 MG/1
20 TABLET ORAL
Qty: 10 | Refills: 0 | Status: DISCONTINUED | COMMUNITY
Start: 2020-02-11 | End: 2021-05-10

## 2021-05-10 RX ORDER — IXEKIZUMAB 80 MG/ML
80 INJECTION, SOLUTION SUBCUTANEOUS
Qty: 1 | Refills: 0 | Status: COMPLETED | COMMUNITY
Start: 2020-08-03

## 2021-05-10 RX ORDER — DOXYCYCLINE HYCLATE 100 MG/1
100 CAPSULE ORAL
Qty: 14 | Refills: 0 | Status: COMPLETED | COMMUNITY
Start: 2021-03-22

## 2021-05-10 RX ORDER — ALPRAZOLAM 0.25 MG/1
0.25 TABLET ORAL
Qty: 30 | Refills: 0 | Status: ACTIVE | COMMUNITY
Start: 2021-05-10

## 2021-05-10 RX ORDER — CEPHALEXIN 500 MG/1
500 CAPSULE ORAL
Qty: 20 | Refills: 0 | Status: COMPLETED | COMMUNITY
Start: 2021-04-09

## 2021-05-10 RX ORDER — LANSOPRAZOLE 30 MG/1
30 CAPSULE, DELAYED RELEASE ORAL
Qty: 28 | Refills: 0 | Status: COMPLETED | COMMUNITY
Start: 2021-03-05

## 2021-05-10 RX ORDER — ALBUTEROL SULFATE 90 UG/1
108 (90 BASE) INHALANT RESPIRATORY (INHALATION)
Qty: 18 | Refills: 0 | Status: COMPLETED | COMMUNITY
Start: 2021-02-22

## 2021-05-10 NOTE — ASSESSMENT
[High Risk Surgery - Intraperitoneal, Intrathoracic or Supringuinal Vascular Procedures] : High Risk Surgery - Intraperitoneal, Intrathoracic or Supringuinal Vascular Procedures - No (0) [Ischemic Heart Disease] : Ischemic Heart Disease - No (0) [Congestive Heart Failure] : Congestive Heart Failure - No (0) [Prior Cerebrovascular Accident or TIA] : Prior Cerebrovascular Accident or TIA - No (0) [Creatinine >= 2mg/dL (1 Point)] : Creatinine >= 2mg/dL - No (0) [Insulin-dependent Diabetic (1 Point)] : Insulin-dependent Diabetic - No (0) [0] : 0 , RCRI Class: I, Risk of Post-Op Cardiac Complications: 3.9%, 95% CI for Risk Estimate: 2.8% - 5.4% [FreeTextEntry4] : PREOP for Lap sleeve \par RCRI -0 \par Meds reviewed.\par Patient will take morning meds with water the morning of procedure.\par Patient to stop all vitamins, aspirin and NSAIDs 5 days prior to procedure.\par Patient will followup after surgery \par Patient Low  risk for Moderate risk Procedure \par Patient with h/o of DVT postop encourage early ambulation \par Pt is medically cleared for procedure and medically optimized -Pending cardiology clearance \par Patient is a nonsmoker.\par Had stress test done at [facility] will get records -[phone] \par \par Asthma\par -stable \par -cleared by Pulmonology \par -last time taken albuterol INH 4 months ago \par \par Insomnia \par -takes Ambien \par \par Anxiety \par -takes xanax as needed \par \par PreDM\par -loose weight and exercise \par \par Cerebral Aneurysm\par -stable \par -following Neurosurgery \par \par \par RTO for CPE after surgery \par \par

## 2021-05-10 NOTE — HISTORY OF PRESENT ILLNESS
[No Pertinent Cardiac History] : no history of aortic stenosis, atrial fibrillation, coronary artery disease, recent myocardial infarction, or implantable device/pacemaker [No Pertinent Pulmonary History] : no history of asthma, COPD, sleep apnea, or smoking [No Adverse Anesthesia Reaction] : no adverse anesthesia reaction in self or family member [(Patient denies any chest pain, claudication, dyspnea on exertion, orthopnea, palpitations or syncope)] : Patient denies any chest pain, claudication, dyspnea on exertion, orthopnea, palpitations or syncope [Chronic Anticoagulation] : no chronic anticoagulation [Chronic Kidney Disease] : no chronic kidney disease [Diabetes] : no diabetes [FreeTextEntry1] : Lap sleeve gastrectomy  [FreeTextEntry2] : 05/18/2021  [FreeTextEntry3] : Dr Correa  [FreeTextEntry4] : 54 year yrs old female with PMH of Cerebral Aneurysm s/p Coil embolization 2005 and 2013 ,h/o of DVT (Provoked DVT) ,Asthma (Proair as needed) for Preop Clearance .\par 2014 had Lap Band and started gaining after she took it away for 7 years had it removed 2019\par 2015 had blood clot after the surgery which was provoked .\par Denies any other complaints. No Fever, Chills, Nausea, Vomiting, Diarrhea, Headache, Chest Pain, Shortness of breath or Abdominal pain.\par \par

## 2021-05-15 ENCOUNTER — APPOINTMENT (OUTPATIENT)
Dept: DISASTER EMERGENCY | Facility: CLINIC | Age: 56
End: 2021-05-15

## 2021-05-16 LAB — SARS-COV-2 N GENE NPH QL NAA+PROBE: NOT DETECTED

## 2021-05-18 ENCOUNTER — INPATIENT (INPATIENT)
Facility: HOSPITAL | Age: 56
LOS: 0 days | Discharge: HOME | End: 2021-05-19
Payer: MEDICAID

## 2021-05-18 DIAGNOSIS — I72.9 ANEURYSM OF UNSPECIFIED SITE: Chronic | ICD-10-CM

## 2021-05-18 DIAGNOSIS — Z90.710 ACQUIRED ABSENCE OF BOTH CERVIX AND UTERUS: Chronic | ICD-10-CM

## 2021-05-19 PROCEDURE — 74220 X-RAY XM ESOPHAGUS 1CNTRST: CPT | Mod: 26

## 2021-06-01 ENCOUNTER — NON-APPOINTMENT (OUTPATIENT)
Age: 56
End: 2021-06-01

## 2021-06-01 DIAGNOSIS — M54.2 CERVICALGIA: ICD-10-CM

## 2021-06-01 RX ORDER — ALPRAZOLAM 0.25 MG/1
0.25 TABLET ORAL
Qty: 2 | Refills: 0 | Status: ACTIVE | COMMUNITY
Start: 2021-06-01 | End: 1900-01-01

## 2021-06-19 ENCOUNTER — APPOINTMENT (OUTPATIENT)
Dept: MRI IMAGING | Facility: CLINIC | Age: 56
End: 2021-06-19

## 2021-06-22 ENCOUNTER — APPOINTMENT (OUTPATIENT)
Dept: NEUROSURGERY | Facility: CLINIC | Age: 56
End: 2021-06-22

## 2021-07-06 ENCOUNTER — APPOINTMENT (OUTPATIENT)
Dept: FAMILY MEDICINE | Facility: CLINIC | Age: 56
End: 2021-07-06

## 2021-08-17 ENCOUNTER — APPOINTMENT (OUTPATIENT)
Dept: NEUROSURGERY | Facility: CLINIC | Age: 56
End: 2021-08-17
Payer: MEDICAID

## 2021-08-17 VITALS
OXYGEN SATURATION: 98 % | TEMPERATURE: 97.1 F | DIASTOLIC BLOOD PRESSURE: 76 MMHG | WEIGHT: 205 LBS | HEIGHT: 65 IN | HEART RATE: 65 BPM | SYSTOLIC BLOOD PRESSURE: 111 MMHG | RESPIRATION RATE: 12 BRPM | BODY MASS INDEX: 34.16 KG/M2

## 2021-08-17 PROCEDURE — 99213 OFFICE O/P EST LOW 20 MIN: CPT

## 2021-08-17 NOTE — PHYSICAL EXAM
[General Appearance - Alert] : alert [General Appearance - In No Acute Distress] : in no acute distress [Oriented To Time, Place, And Person] : oriented to person, place, and time [Impaired Insight] : insight and judgment were intact [Motor Tone] : muscle tone was normal in all four extremities [Motor Strength] : muscle strength was normal in all four extremities [Neck Appearance] : the appearance of the neck was normal [] : no respiratory distress [Abnormal Walk] : normal gait

## 2021-08-17 NOTE — ADDENDUM
[FreeTextEntry1] : 2021\par \par Marla Olson MD\par [address]\[place] [address]\par \par Patient’s Name:  Kelley Cortes\par : 1965\par \par Dear Dr. Olson:\par \par We saw Kelley in our office at [facility].  As you know, she is a 56 years-old left-handed woman with past medical history significant for subarachnoid hemorrhage in 2005.  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  She had a remarkable recovery.  Neurological examination is normal.  Follow up neuro-imaging demonstrated new aneurysm formation at the level of the neck of the previously coiled aneurysm.  She underwent coiling of the new aneurysm formation in 2013.  An MRI and cerebral angiogram from 2019 didn’t show ischemic stroke, hemorrhage, coil compaction or recanalization of the treated aneurysm.  After she experienced a new onset of headache in 2021 a CTA of the head was obtained.  There is no evidence of new aneurysm formation or recanalization.\par \par A follow up MRA of the brain is recommended in 5 years. \par \par Thank you for allowing us to participate in Kelley’ care.  I will keep you updated at all times.\par \par Sincerely,\par \par \par Derek Zepeda MD\par Chief,\par Neuro-Endovascular Surgery and \par Interventional Neuroradiology \par [facility]\par \par [facility]\par [address]\par [address] Floor\[place] [address]\par T:  [phone]\par F:  [phone]\par \par cc:	Kelley Cortes\par 	[address]\par 	[address]\[place] [address]\par \par \par \par

## 2021-08-17 NOTE — ASSESSMENT
[FreeTextEntry1] :  CTA of the head was obtained after experiencing severe headaches..  There is no evidence of new aneurysm formation or recanalization.\par \par A follow up MRA of the brain is recommended in 5 years. \par \par \par \par Plan:\par 1. MRA head in 5 years and RTO to review it.

## 2021-08-17 NOTE — DATA REVIEWED
[de-identified] : CTa head and neck: No residual/recanalization of the aneurysms. No dissection noted.

## 2021-08-17 NOTE — HISTORY OF PRESENT ILLNESS
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 Follow up neuro-imaging demonstrated a new aneurysm formation at the level of the neck of the previously coiled aneurysm. She underwent coiling of the new aneurysm formation on December 2013. Follow up from Jan 2015 and a cerebral angiogram from April 17, 2017 did not show residual, coil compaction or recanalization of previously coiled anterior communicating complex. Repeat MRA brain form Aug 2019 which showed no evidence of coil compaction/recanalization of the treated A-comm aneurysm.\par \par Handedness: LEFT\par \par Patient Address:\par [address]\[place] [address]\par \par \par PCP:\par Dr. Marla Olson\par [address] [address]\par [phone] \par

## 2022-11-09 NOTE — ED ADULT NURSE NOTE - IS THE PATIENT ABLE TO BE SCREENED?
Problem: Pain  Goal: #Acceptable pain level achieved/maintained at rest using NRS/Faces  Description: This goal is used for patients who can self-report.  Acceptable means the level is at or below the identified comfort/function goal.  Outcome: Outcome Met, Continue evaluating goal progress toward completion  Goal: # Acceptable pain level achieved/maintained at rest using NRS/Faces without oversedation (opioid naive or PCA/Epidural infusion)  Description: This goal is used if Opioid-naïve or on PCA/Epidural Infusion.  Outcome: Outcome Met, Continue evaluating goal progress toward completion  Goal: # Acceptable pain level achieved/maintained with activity using NRS/Faces  Description: This goal is used for patients who can self-report and are not achieving acceptable pain control during activity.  Outcome: Outcome Met, Continue evaluating goal progress toward completion  Goal: # Verbalizes understanding of pain management  Description: Documented in Patient Education Activity  Outcome: Outcome Met, Continue evaluating goal progress toward completion  Goal: Verbalizes understanding and effective use of Patient Controlled Analgesia (PCA)  Description: Documented in Patient Education Activity  This goal is used for patients with PCA  Outcome: Outcome Met, Continue evaluating goal progress toward completion     Problem: Pressure Injury, Risk for  Goal: # Skin remains intact  Outcome: Outcome Met, Continue evaluating goal progress toward completion  Goal: No new pressure injury (PI) development  Outcome: Outcome Met, Continue evaluating goal progress toward completion  Goal: # Verbalizes understanding of PI risk factors and prevention strategies  Description: Document education using the patient education activity.   Outcome: Outcome Met, Continue evaluating goal progress toward completion  Goal: Comfort optimized with pressure injury prevention strategies guided by patient/family preference. (Hospice)  Outcome: Outcome  Met, Continue evaluating goal progress toward completion     Problem: Non-Pressure Injury Wound  Goal: # No deterioration in wound  Outcome: Outcome Met, Continue evaluating goal progress toward completion  Goal: # Verbalizes understanding of wound and wound care  Description: If abnormality is a skin tear, avoid using tape on skin including transparent dressings. Document education using the patient education activity.  Outcome: Outcome Met, Continue evaluating goal progress toward completion  Goal: Participates in wound care activities  Outcome: Outcome Met, Continue evaluating goal progress toward completion  Goal: Wound care provided to promote comfort needs (Hospice)  Outcome: Outcome Met, Continue evaluating goal progress toward completion     Problem: Cellulitis  Goal: Cellulitis improved as evidenced by decreased redness, swelling and pain  Description: Girth measurement may be used to evaluate edema.  Outcome: Outcome Met, Continue evaluating goal progress toward completion     Problem: At Risk for Falls  Goal: # Patient does not fall  Outcome: Outcome Met, Continue evaluating goal progress toward completion  Goal: # Takes action to control fall-related risks  Outcome: Outcome Met, Continue evaluating goal progress toward completion  Goal: # Verbalizes understanding of fall risk/precautions  Description: Document education using the patient education activity  Outcome: Outcome Met, Continue evaluating goal progress toward completion     Problem: At Risk for Injury Due to Fall  Goal: # Patient does not fall  Outcome: Outcome Met, Continue evaluating goal progress toward completion  Goal: # Takes action to control condition specific risks  Outcome: Outcome Met, Continue evaluating goal progress toward completion  Goal: # Verbalizes understanding of fall-related injury personal risks  Description: Document education using the patient education activity  Outcome: Outcome Met, Continue evaluating goal progress  toward completion     Problem: Impaired Physical Mobility  Goal: # Bed mobility, ambulation, and ADLs are maintained or returned to baseline during hospitalization  Outcome: Outcome Met, Continue evaluating goal progress toward completion     Problem: Postoperative Care  Goal: Vital signs are maintained within parameters  Outcome: Outcome Met, Continue evaluating goal progress toward completion  Goal: Elimination status is maintained/returned to baseline  Outcome: Outcome Met, Continue evaluating goal progress toward completion  Goal: Oral intake is resumed and tolerated  Outcome: Outcome Met, Continue evaluating goal progress toward completion  Goal: Activity level is resumed to level needed for d/c  Outcome: Outcome Met, Continue evaluating goal progress toward completion  Goal: Verbalizes understanding of postoperative care in the hospital and after d/c  Description: Document on Patient Education Activity  Outcome: Outcome Met, Continue evaluating goal progress toward completion     Problem: VTE, Risk for  Goal: # No s/s of VTE  Outcome: Outcome Met, Continue evaluating goal progress toward completion  Goal: # Verbalizes understanding of VTE risk factors and prevention  Description: Document education using the patient education activity.   Outcome: Outcome Met, Continue evaluating goal progress toward completion  Goal: Demonstrates ability to administer injectable anticoagulants if ordered for d/c  Description: Document education using the patient education activity.  Outcome: Outcome Met, Continue evaluating goal progress toward completion     Problem: Diabetes  Goal: Glycemic balance achieved/maintained  Description: Goal is to maintain blood sugar within range with no episodes of hypoglycemia  Outcome: Outcome Met, Continue evaluating goal progress toward completion  Goal: Verbalizes/demonstrates understanding of Diabetes  Description: Document on Patient Education Activity  Outcome: Outcome Met, Continue  evaluating goal progress toward completion  Goal: Demonstrates ability to self-administer insulin  Description: Document on Patient Education Activity  Outcome: Outcome Met, Continue evaluating goal progress toward completion     Problem: Lumbar Spine Surgery with/without Fusion  Goal: Vital signs are maintained within parameters  Outcome: Outcome Met, Continue evaluating goal progress toward completion  Goal: Oral intake is resumed and tolerated  Outcome: Outcome Met, Continue evaluating goal progress toward completion  Goal: Urinary elimination pattern returned to baseline  Description: Patient must be making adequate amounts of urine output (240cc/8 hours) and voiding. If indwelling urinary catheter: Remove asap (no later an Day 2 or provider must specify reason for continued use).  Outcome: Outcome Met, Continue evaluating goal progress toward completion  Goal: Mobility and self-care ability maintained with identified back restrictions  Outcome: Outcome Met, Continue evaluating goal progress toward completion  Goal: Bowel elimination pattern returned to baseline  Outcome: Outcome Met, Continue evaluating goal progress toward completion  Goal: Verbalizes understanding of post-op lumbar spine surgery care  Description: Document on Patient Education Activity  Outcome: Outcome Met, Continue evaluating goal progress toward completion     Problem: Activity Intolerance  Goal: # Functional status is maintained or returned to baseline  Outcome: Outcome Met, Continue evaluating goal progress toward completion  Goal: # Tolerates activity for d/c setting with no clinical problems  Outcome: Outcome Met, Continue evaluating goal progress toward completion     Problem: Elimination-Bowel, Alteration  Goal: # Bowel function maintained/improved  Outcome: Outcome Met, Continue evaluating goal progress toward completion  Goal: Bowel function maintained/improved with bowel management program ([facility])  Outcome: Outcome Met,  Continue evaluating goal progress toward completion  Goal: # Verbalizes understanding of constipation management  Description: Document on Patient Education Activity  Outcome: Outcome Met, Continue evaluating goal progress toward completion  Goal: Verbalizes understanding of bowel management program  Description: Document on Patient Education Activity  Outcome: Outcome Met, Continue evaluating goal progress toward completion     Problem: Potential for injury, Restraints  Goal: # Remains free from injury  Outcome: Outcome Met, Continue evaluating goal progress toward completion  Goal: Verbalizes criteria for restraint discontinuation  Description: Document on Patient Education Activity  Outcome: Outcome Met, Continue evaluating goal progress toward completion     Problem: Delirium, Risk for  Goal: # No symptoms of delirium  Description: Evaluate delirium symptoms under active problem when present  Outcome: Outcome Met, Continue evaluating goal progress toward completion  Goal: # Verbalizes understanding of delirium preventive strategies  Description: Document on Patient Education Activity   Outcome: Outcome Met, Continue evaluating goal progress toward completion     Problem: Delirium  Goal: # Symptoms of delirium resolved for 24 hours  Description: Evaluate delirium symptoms under active problem when present  Outcome: Outcome Met, Continue evaluating goal progress toward completion  Goal: # Verbalizes understanding of delirium symptoms, management, and follow up (family/patient)  Description: Document on Patient Education Activity   Outcome: Outcome Met, Continue evaluating goal progress toward completion      Yes

## 2022-11-23 NOTE — ED CLERICAL - CLERICAL COMMENTS
EXIT INTERVIEW COMPLETED Constitutional: No fever or chills  Eyes: No visual changes, eye pain   CV: No chest pain or lower extremity edema  Resp: No SOB no cough  GI: No abd pain. No nausea or vomiting.   : No dysuria, hematuria.   MSK: No musculoskeletal pain  Skin: No rash  Psych: +history of anxiety   Neuro: +word finding difficulty + numbness/ tingling. No new weakness.  Endo: +pre-DM

## 2022-12-27 ENCOUNTER — APPOINTMENT (OUTPATIENT)
Dept: NEUROSURGERY | Facility: CLINIC | Age: 57
End: 2022-12-27

## 2023-03-28 ENCOUNTER — APPOINTMENT (OUTPATIENT)
Dept: FAMILY MEDICINE | Facility: CLINIC | Age: 58
End: 2023-03-28
Payer: MEDICAID

## 2023-03-28 ENCOUNTER — NON-APPOINTMENT (OUTPATIENT)
Age: 58
End: 2023-03-28

## 2023-03-28 VITALS
WEIGHT: 205 LBS | HEART RATE: 70 BPM | BODY MASS INDEX: 34.16 KG/M2 | SYSTOLIC BLOOD PRESSURE: 107 MMHG | DIASTOLIC BLOOD PRESSURE: 73 MMHG | TEMPERATURE: 97.9 F | HEIGHT: 65 IN | OXYGEN SATURATION: 96 %

## 2023-03-28 DIAGNOSIS — E66.9 OBESITY, UNSPECIFIED: ICD-10-CM

## 2023-03-28 DIAGNOSIS — F41.9 ANXIETY DISORDER, UNSPECIFIED: ICD-10-CM

## 2023-03-28 DIAGNOSIS — Z00.00 ENCOUNTER FOR GENERAL ADULT MEDICAL EXAMINATION W/OUT ABNORMAL FINDINGS: ICD-10-CM

## 2023-03-28 DIAGNOSIS — L40.9 PSORIASIS, UNSPECIFIED: ICD-10-CM

## 2023-03-28 DIAGNOSIS — J45.909 UNSPECIFIED ASTHMA, UNCOMPLICATED: ICD-10-CM

## 2023-03-28 DIAGNOSIS — R55 SYNCOPE AND COLLAPSE: ICD-10-CM

## 2023-03-28 DIAGNOSIS — I67.1 CEREBRAL ANEURYSM, NONRUPTURED: ICD-10-CM

## 2023-03-28 DIAGNOSIS — Z12.39 ENCOUNTER FOR OTHER SCREENING FOR MALIGNANT NEOPLASM OF BREAST: ICD-10-CM

## 2023-03-28 PROCEDURE — 99396 PREV VISIT EST AGE 40-64: CPT | Mod: 25

## 2023-03-28 PROCEDURE — 93000 ELECTROCARDIOGRAM COMPLETE: CPT

## 2023-03-28 RX ORDER — VENLAFAXINE HYDROCHLORIDE 37.5 MG/1
37.5 CAPSULE, EXTENDED RELEASE ORAL
Qty: 90 | Refills: 1 | Status: ACTIVE | COMMUNITY
Start: 2023-03-28 | End: 1900-01-01

## 2023-03-28 RX ORDER — HYDROXYZINE HYDROCHLORIDE 25 MG/1
25 TABLET ORAL EVERY 6 HOURS
Qty: 120 | Refills: 1 | Status: ACTIVE | COMMUNITY
Start: 2023-03-28 | End: 1900-01-01

## 2023-04-28 ENCOUNTER — APPOINTMENT (OUTPATIENT)
Dept: FAMILY MEDICINE | Facility: CLINIC | Age: 58
End: 2023-04-28

## 2023-05-29 ENCOUNTER — EMERGENCY (EMERGENCY)
Facility: HOSPITAL | Age: 58
LOS: 0 days | Discharge: LEFT BEFORE DISCHARGE | End: 2023-05-29
Payer: MEDICAID

## 2023-05-29 VITALS — HEIGHT: 66 IN | WEIGHT: 121.03 LBS

## 2023-05-29 DIAGNOSIS — Z90.710 ACQUIRED ABSENCE OF BOTH CERVIX AND UTERUS: Chronic | ICD-10-CM

## 2023-05-29 DIAGNOSIS — R19.7 DIARRHEA, UNSPECIFIED: ICD-10-CM

## 2023-05-29 DIAGNOSIS — I72.9 ANEURYSM OF UNSPECIFIED SITE: Chronic | ICD-10-CM

## 2023-05-29 DIAGNOSIS — Z53.21 PROCEDURE AND TREATMENT NOT CARRIED OUT DUE TO PATIENT LEAVING PRIOR TO BEING SEEN BY HEALTH CARE PROVIDER: ICD-10-CM

## 2023-05-29 PROCEDURE — L9991: CPT

## 2023-05-29 NOTE — ED ADULT TRIAGE NOTE - CHIEF COMPLAINT QUOTE
Pt c/o diarrhea x2 weeks. Endorses 4-5 episodes a day and generalized weakness. HX of gastric sleeve 2021. Denies abdominal pain, N/V, fever, recent travel, or ABX use.

## 2023-05-30 ENCOUNTER — APPOINTMENT (OUTPATIENT)
Dept: FAMILY MEDICINE | Facility: CLINIC | Age: 58
End: 2023-05-30

## 2023-06-05 ENCOUNTER — NON-APPOINTMENT (OUTPATIENT)
Age: 58
End: 2023-06-05

## 2024-05-10 ENCOUNTER — APPOINTMENT (OUTPATIENT)
Dept: FAMILY MEDICINE | Facility: CLINIC | Age: 59
End: 2024-05-10

## 2024-11-06 ENCOUNTER — APPOINTMENT (OUTPATIENT)
Dept: NEUROSURGERY | Facility: CLINIC | Age: 59
End: 2024-11-06
Payer: MEDICAID

## 2024-11-06 DIAGNOSIS — R41.3 OTHER AMNESIA: ICD-10-CM

## 2024-11-06 DIAGNOSIS — I67.1 CEREBRAL ANEURYSM, NONRUPTURED: ICD-10-CM

## 2024-11-06 DIAGNOSIS — Z76.89 PERSONS ENCOUNTERING HEALTH SERVICES IN OTHER SPECIFIED CIRCUMSTANCES: ICD-10-CM

## 2024-11-06 DIAGNOSIS — I60.8 OTHER NONTRAUMATIC SUBARACHNOID HEMORRHAGE: ICD-10-CM

## 2024-11-06 DIAGNOSIS — R42 DIZZINESS AND GIDDINESS: ICD-10-CM

## 2024-11-06 DIAGNOSIS — Z98.890 OTHER SPECIFIED POSTPROCEDURAL STATES: ICD-10-CM

## 2024-11-06 PROCEDURE — 99204 OFFICE O/P NEW MOD 45 MIN: CPT

## 2024-11-06 RX ORDER — MULTIVIT,IRON,MINERALS/LUTEIN
TABLET ORAL
Refills: 0 | Status: ACTIVE | COMMUNITY